# Patient Record
Sex: FEMALE | Employment: FULL TIME | URBAN - METROPOLITAN AREA
[De-identification: names, ages, dates, MRNs, and addresses within clinical notes are randomized per-mention and may not be internally consistent; named-entity substitution may affect disease eponyms.]

---

## 2017-02-17 ENCOUNTER — TELEPHONE (OUTPATIENT)
Dept: FAMILY MEDICINE CLINIC | Facility: CLINIC | Age: 57
End: 2017-02-17

## 2017-02-17 ENCOUNTER — OFFICE VISIT (OUTPATIENT)
Dept: FAMILY MEDICINE CLINIC | Facility: CLINIC | Age: 57
End: 2017-02-17

## 2017-02-17 VITALS
SYSTOLIC BLOOD PRESSURE: 110 MMHG | BODY MASS INDEX: 32.37 KG/M2 | TEMPERATURE: 98 F | WEIGHT: 185 LBS | RESPIRATION RATE: 16 BRPM | HEART RATE: 64 BPM | HEIGHT: 63.25 IN | DIASTOLIC BLOOD PRESSURE: 84 MMHG

## 2017-02-17 DIAGNOSIS — D64.9 ANEMIA, UNSPECIFIED TYPE: ICD-10-CM

## 2017-02-17 DIAGNOSIS — E11.9 DIABETES MELLITUS TYPE 2, NONINSULIN DEPENDENT (HCC): ICD-10-CM

## 2017-02-17 DIAGNOSIS — I10 ESSENTIAL HYPERTENSION: ICD-10-CM

## 2017-02-17 DIAGNOSIS — Z00.00 WELL ADULT EXAM: Primary | ICD-10-CM

## 2017-02-17 DIAGNOSIS — Z12.31 ENCOUNTER FOR SCREENING MAMMOGRAM FOR BREAST CANCER: ICD-10-CM

## 2017-02-17 DIAGNOSIS — E78.5 HYPERLIPIDEMIA, UNSPECIFIED HYPERLIPIDEMIA TYPE: ICD-10-CM

## 2017-02-17 LAB
ALBUMIN SERPL-MCNC: 4.2 G/DL (ref 3.5–4.8)
ALP LIVER SERPL-CCNC: 72 U/L (ref 41–108)
ALT SERPL-CCNC: 35 U/L (ref 14–54)
AST SERPL-CCNC: 22 U/L (ref 15–41)
BILIRUB SERPL-MCNC: 0.5 MG/DL (ref 0.1–2)
BUN BLD-MCNC: 13 MG/DL (ref 8–20)
CALCIUM BLD-MCNC: 9 MG/DL (ref 8.3–10.3)
CHLORIDE: 100 MMOL/L (ref 101–111)
CHOLEST SMN-MCNC: 241 MG/DL (ref ?–200)
CO2: 32 MMOL/L (ref 22–32)
CREAT BLD-MCNC: 0.74 MG/DL (ref 0.55–1.02)
CREAT UR-SCNC: 157 MG/DL
ERYTHROCYTE [DISTWIDTH] IN BLOOD BY AUTOMATED COUNT: 16.2 % (ref 11.5–16)
GLUCOSE BLD-MCNC: 81 MG/DL (ref 70–99)
HCT VFR BLD AUTO: 37.2 % (ref 34–50)
HDLC SERPL-MCNC: 66 MG/DL (ref 45–?)
HDLC SERPL: 3.65 {RATIO} (ref ?–4.44)
HGB BLD-MCNC: 11.6 G/DL (ref 12–16)
LDLC SERPL CALC-MCNC: 160 MG/DL (ref ?–130)
M PROTEIN MFR SERPL ELPH: 8 G/DL (ref 6.1–8.3)
MCH RBC QN AUTO: 22.8 PG (ref 27–33.2)
MCHC RBC AUTO-ENTMCNC: 31.2 G/DL (ref 31–37)
MCV RBC AUTO: 73.2 FL (ref 81–100)
MICROALBUMIN UR-MCNC: 1.05 MG/DL
MICROALBUMIN/CREAT 24H UR-RTO: 6.7 UG/MG (ref ?–30)
NONHDLC SERPL-MCNC: 175 MG/DL (ref ?–130)
PLATELET # BLD AUTO: 199 10(3)UL (ref 150–450)
POTASSIUM SERPL-SCNC: 3.5 MMOL/L (ref 3.6–5.1)
RBC # BLD AUTO: 5.08 X10(6)UL (ref 3.8–5.1)
RED CELL DISTRIBUTION WIDTH-SD: 41.5 FL (ref 35.1–46.3)
SODIUM SERPL-SCNC: 139 MMOL/L (ref 136–144)
TRIGLYCERIDES: 77 MG/DL (ref ?–150)
TSI SER-ACNC: 1.33 MIU/ML (ref 0.35–5.5)
VLDL: 15 MG/DL (ref 5–40)
WBC # BLD AUTO: 7.3 X10(3) UL (ref 4–13)

## 2017-02-17 PROCEDURE — 85027 COMPLETE CBC AUTOMATED: CPT | Performed by: FAMILY MEDICINE

## 2017-02-17 PROCEDURE — 80061 LIPID PANEL: CPT | Performed by: FAMILY MEDICINE

## 2017-02-17 PROCEDURE — 82728 ASSAY OF FERRITIN: CPT | Performed by: FAMILY MEDICINE

## 2017-02-17 PROCEDURE — 82570 ASSAY OF URINE CREATININE: CPT | Performed by: FAMILY MEDICINE

## 2017-02-17 PROCEDURE — 80053 COMPREHEN METABOLIC PANEL: CPT | Performed by: FAMILY MEDICINE

## 2017-02-17 PROCEDURE — 84443 ASSAY THYROID STIM HORMONE: CPT | Performed by: FAMILY MEDICINE

## 2017-02-17 PROCEDURE — 82043 UR ALBUMIN QUANTITATIVE: CPT | Performed by: FAMILY MEDICINE

## 2017-02-17 PROCEDURE — G0438 PPPS, INITIAL VISIT: HCPCS | Performed by: FAMILY MEDICINE

## 2017-02-17 PROCEDURE — 99203 OFFICE O/P NEW LOW 30 MIN: CPT | Performed by: FAMILY MEDICINE

## 2017-02-17 PROCEDURE — 99386 PREV VISIT NEW AGE 40-64: CPT | Performed by: FAMILY MEDICINE

## 2017-02-17 PROCEDURE — 83036 HEMOGLOBIN GLYCOSYLATED A1C: CPT | Performed by: FAMILY MEDICINE

## 2017-02-17 RX ORDER — AMLODIPINE BESYLATE 10 MG/1
1 TABLET ORAL DAILY
Refills: 0 | COMMUNITY
Start: 2016-12-01 | End: 2017-05-30

## 2017-02-17 RX ORDER — METFORMIN HYDROCHLORIDE 500 MG/1
1-3 TABLET, EXTENDED RELEASE ORAL DAILY
Refills: 0 | COMMUNITY
Start: 2016-12-01 | End: 2017-02-20

## 2017-02-17 RX ORDER — INDAPAMIDE 1.25 MG/1
1 TABLET, FILM COATED ORAL EVERY MORNING
Refills: 0 | COMMUNITY
Start: 2016-12-01 | End: 2017-05-30

## 2017-02-17 RX ORDER — LISINOPRIL 20 MG/1
1 TABLET ORAL DAILY
Refills: 0 | COMMUNITY
Start: 2016-12-01 | End: 2017-02-20

## 2017-02-17 NOTE — PROGRESS NOTES
Gagan Zavaleta is a 49year old female.     CC:  Patient presents with:  Establish Care: per pt  Well Adult: per pt      HPI:  Patient is here for yearly, wellness exam  Last Lipid: about one year  Last colonoscopy: about 3 years and normal per pt Status: Never Smoker                      Alcohol Use: No                 ROS:  General: energy level stable  Cardiovascular/Pulses: Denies exertional chest pain or pressure  Respiratory: Denies dyspnea on exertion  GI: Denies abdominal pain    Vitals: BP Future    3. Encounter for screening mammogram for breast cancer      - [facility] SCREENING BILAT (CPT=77067); Future    4. Essential hypertension  Stable   Consider d/c Lisinopril if kidney function fine given her cough    5.  Hyperlipidemia, unspecified hyperli

## 2017-02-18 LAB
EST. AVERAGE GLUCOSE BLD GHB EST-MCNC: 146 MG/DL (ref 68–126)
HBA1C MFR BLD HPLC: 6.7 % (ref ?–5.7)

## 2017-02-20 ENCOUNTER — TELEPHONE (OUTPATIENT)
Dept: FAMILY MEDICINE CLINIC | Facility: CLINIC | Age: 57
End: 2017-02-20

## 2017-02-20 LAB — DEPRECATED HBV CORE AB SER IA-ACNC: 102.5 NG/ML (ref 10–291)

## 2017-02-20 RX ORDER — PHENTERMINE HYDROCHLORIDE 37.5 MG/1
37.5 CAPSULE ORAL EVERY MORNING
Qty: 30 CAPSULE | Refills: 0 | Status: SHIPPED
Start: 2017-02-20 | End: 2017-05-22

## 2017-02-20 NOTE — TELEPHONE ENCOUNTER
Phentermine rx was sent to the pharmacy today (lisinopril and metformin were sent as well but phentermine is the new rx). Is there an alternative that can be prescribed?

## 2017-02-20 NOTE — TELEPHONE ENCOUNTER
Patient notified and verbalized understanding. Appointment already made for 3/20/16. Please sign med. Pharmacy is [facility] [phone]

## 2017-02-20 NOTE — TELEPHONE ENCOUNTER
Pt notified by phone and verbalized understanding. Pt found the medication online listed as costing $11.  She was told it would cost her approximately $30. I informed the pt that we do not have information regarding the price of the medication and that her

## 2017-02-20 NOTE — TELEPHONE ENCOUNTER
There is no alternative, lesser expensive medication that I know of. If she wants, she can contact her insurance and ask for a formulary of weight loss medications.  Thanks

## 2017-02-23 ENCOUNTER — HOSPITAL ENCOUNTER (OUTPATIENT)
Dept: MAMMOGRAPHY | Age: 57
Discharge: HOME | End: 2017-02-23
Attending: FAMILY MEDICINE
Payer: COMMERCIAL

## 2017-02-23 ENCOUNTER — MED REC SCAN ONLY (OUTPATIENT)
Dept: FAMILY MEDICINE CLINIC | Facility: CLINIC | Age: 57
End: 2017-02-23

## 2017-02-23 ENCOUNTER — TELEPHONE (OUTPATIENT)
Dept: FAMILY MEDICINE CLINIC | Facility: CLINIC | Age: 57
End: 2017-02-23

## 2017-02-23 DIAGNOSIS — Z12.31 ENCOUNTER FOR SCREENING MAMMOGRAM FOR BREAST CANCER: ICD-10-CM

## 2017-02-23 PROCEDURE — 77067 SCR MAMMO BI INCL CAD: CPT

## 2017-02-23 RX ORDER — PRAVASTATIN SODIUM 20 MG
20 TABLET ORAL NIGHTLY
Qty: 30 TABLET | Refills: 3 | Status: SHIPPED | OUTPATIENT
Start: 2017-02-23

## 2017-02-23 NOTE — TELEPHONE ENCOUNTER
Please let patient know or leave message that her CT heart score was 27. This puts her at moderate risk for coronary artery narrowing.  We need to place her on Pravachol 20 mg, 1 qhs, #30, 3 rf to reduce her cholesterol levels and thus reduce her risk of he

## 2017-02-23 NOTE — TELEPHONE ENCOUNTER
Patient advised of the CT heart score results  Prescription sent to the pharmacy per Dr. Pool Cerna   Recall done in the computer

## 2017-02-27 ENCOUNTER — HOSPITAL ENCOUNTER (OUTPATIENT)
Dept: ULTRASOUND IMAGING | Age: 57
Discharge: HOME | End: 2017-02-27
Attending: FAMILY MEDICINE
Payer: COMMERCIAL

## 2017-02-27 ENCOUNTER — HOSPITAL ENCOUNTER (OUTPATIENT)
Dept: MAMMOGRAPHY | Age: 57
Discharge: HOME | End: 2017-02-27
Attending: FAMILY MEDICINE
Payer: COMMERCIAL

## 2017-02-27 DIAGNOSIS — R92.8 ABNORMAL MAMMOGRAM OF LEFT BREAST: ICD-10-CM

## 2017-02-27 DIAGNOSIS — N60.02 BREAST CYST, LEFT: Primary | ICD-10-CM

## 2017-02-27 PROCEDURE — 77061 BREAST TOMOSYNTHESIS UNI: CPT

## 2017-02-27 PROCEDURE — 77065 DX MAMMO INCL CAD UNI: CPT

## 2017-02-27 PROCEDURE — 76642 ULTRASOUND BREAST LIMITED: CPT

## 2017-03-08 ENCOUNTER — MED REC SCAN ONLY (OUTPATIENT)
Dept: FAMILY MEDICINE CLINIC | Facility: CLINIC | Age: 57
End: 2017-03-08

## 2017-03-16 ENCOUNTER — TELEPHONE (OUTPATIENT)
Dept: FAMILY MEDICINE CLINIC | Facility: CLINIC | Age: 57
End: 2017-03-16

## 2017-03-17 ENCOUNTER — OFFICE VISIT (OUTPATIENT)
Dept: FAMILY MEDICINE CLINIC | Facility: CLINIC | Age: 57
End: 2017-03-17

## 2017-03-17 ENCOUNTER — TELEPHONE (OUTPATIENT)
Dept: FAMILY MEDICINE CLINIC | Facility: CLINIC | Age: 57
End: 2017-03-17

## 2017-03-17 VITALS
TEMPERATURE: 98 F | DIASTOLIC BLOOD PRESSURE: 84 MMHG | BODY MASS INDEX: 31.85 KG/M2 | SYSTOLIC BLOOD PRESSURE: 118 MMHG | HEIGHT: 63.25 IN | WEIGHT: 182 LBS | HEART RATE: 76 BPM | RESPIRATION RATE: 16 BRPM | OXYGEN SATURATION: 98 %

## 2017-03-17 DIAGNOSIS — N95.0 POST-MENOPAUSAL BLEEDING: Primary | ICD-10-CM

## 2017-03-17 LAB
CONTROL LINE PRESENT WITH A CLEAR BACKGROUND (YES/NO): YES YES/NO
PREGNANCY TEST, URINE: NEGATIVE

## 2017-03-17 PROCEDURE — 99214 OFFICE O/P EST MOD 30 MIN: CPT | Performed by: FAMILY MEDICINE

## 2017-03-17 PROCEDURE — 81025 URINE PREGNANCY TEST: CPT | Performed by: FAMILY MEDICINE

## 2017-03-17 NOTE — TELEPHONE ENCOUNTER
Patient called back the office and advised that her stomach is cramping - she is going to keep the appointment

## 2017-03-17 NOTE — PROGRESS NOTES
Tahir Salazar is a 49year old female. CC:  Patient presents with:  Vaginal Bleeding: spotting per pt       HPI:  She has noted vaginal spotting for 2 days with some pelvic cramping. She has not had menses in 7 years. She is sexually active.  Edinson Paige nourished, in no apparent distress  EYE: B conjunctiva and lids normal  HENT: normocephalic; normal nose, pharynx and TM's  NECK: No lymphadenopathy, thyromegaly or masses  CAR: S1, S2 normal, RRR; no S3, no S4; no click; murmur negative  PULM: clear to au

## 2017-03-20 ENCOUNTER — HOSPITAL ENCOUNTER (OUTPATIENT)
Dept: ULTRASOUND IMAGING | Age: 57
Discharge: HOME | End: 2017-03-20
Attending: FAMILY MEDICINE
Payer: COMMERCIAL

## 2017-03-20 ENCOUNTER — TELEPHONE (OUTPATIENT)
Dept: FAMILY MEDICINE CLINIC | Facility: CLINIC | Age: 57
End: 2017-03-20

## 2017-03-20 DIAGNOSIS — N95.0 POST-MENOPAUSAL BLEEDING: ICD-10-CM

## 2017-03-20 DIAGNOSIS — N95.0 POSTMENOPAUSAL BLEEDING: Primary | ICD-10-CM

## 2017-03-20 PROCEDURE — 76856 US EXAM PELVIC COMPLETE: CPT

## 2017-03-20 PROCEDURE — 76830 TRANSVAGINAL US NON-OB: CPT

## 2017-03-20 NOTE — TELEPHONE ENCOUNTER
----- Message from Joslyn Treviño MD sent at 3/20/2017  1:06 PM CDT -----  Please let patient know or leave message that her pelvic u/s shows 2 fibroids, these may be the cause for her spotting, next step i Consult OB/GYN.  [facility]  I:[phone]  F:

## 2017-03-20 NOTE — TELEPHONE ENCOUNTER
Patient advised of the information per Dr. Katiuska Her  Patient advised that the referral will be placed for Dr. Gaby Metcalf and she will be contacted once the referral determination has been made

## 2017-03-21 NOTE — TELEPHONE ENCOUNTER
Patient advised referral has been approved   Patient states that she has an appointment on Wednesday

## 2017-03-22 ENCOUNTER — OFFICE VISIT (OUTPATIENT)
Dept: OBGYN CLINIC | Facility: CLINIC | Age: 57
End: 2017-03-22

## 2017-03-22 VITALS
WEIGHT: 183 LBS | HEIGHT: 64 IN | BODY MASS INDEX: 31.24 KG/M2 | SYSTOLIC BLOOD PRESSURE: 112 MMHG | DIASTOLIC BLOOD PRESSURE: 80 MMHG

## 2017-03-22 DIAGNOSIS — D25.0 SUBMUCOUS LEIOMYOMA OF UTERUS: ICD-10-CM

## 2017-03-22 DIAGNOSIS — Z12.4 CERVICAL CANCER SCREENING: ICD-10-CM

## 2017-03-22 DIAGNOSIS — N95.0 POST-MENOPAUSAL BLEEDING: Primary | ICD-10-CM

## 2017-03-22 DIAGNOSIS — R10.2 PELVIC PAIN: ICD-10-CM

## 2017-03-22 PROCEDURE — 87624 HPV HI-RISK TYP POOLED RSLT: CPT | Performed by: OBSTETRICS & GYNECOLOGY

## 2017-03-22 PROCEDURE — 88175 CYTOPATH C/V AUTO FLUID REDO: CPT | Performed by: OBSTETRICS & GYNECOLOGY

## 2017-03-22 PROCEDURE — 99203 OFFICE O/P NEW LOW 30 MIN: CPT | Performed by: OBSTETRICS & GYNECOLOGY

## 2017-03-22 NOTE — PATIENT INSTRUCTIONS
Endometrial Biopsy    Endometrial biopsy is a procedure used to study the endometrium (lining of the uterus). It is usually done in your health care provider’s office. During the biopsy, small tissue samples are taken from inside the uterus.  These are th · A small tube is passed through the cervix into the uterus. · It is normal to feel some cramping when the tube is inserted. But tell your health care provider if you have severe cramping or are very uncomfortable.   · Using mild suction, samples are taken

## 2017-03-22 NOTE — PROGRESS NOTES
Dmitry Helm is a 49year old female. HPI:   Patient presents with: Other: referred by Dr. Brianna Mason, fibroids, pt c/o pain in lower abd      As above, but also had postmenopausal spotting for 3-5 days. No menses for the previous 8 years.   Ultra above    PHYSICAL EXAM:   . ..Vulva:  Normal normal.   Introitus. normal  good perineal support. Vagina:  normal.  no discharge. Cervix:  normal. Pap smear was done. HPV screen done. Uterus:  av and normal  size.   Shape:  normal.  Adnexa:  no masses or te

## 2017-03-25 LAB — HPV I/H RISK 1 DNA SPEC QL NAA+PROBE: NEGATIVE

## 2017-04-26 PROCEDURE — 88305 TISSUE EXAM BY PATHOLOGIST: CPT | Performed by: INTERNAL MEDICINE

## 2017-05-22 RX ORDER — PHENTERMINE HYDROCHLORIDE 37.5 MG/1
CAPSULE ORAL
Qty: 30 CAPSULE | Refills: 1 | Status: SHIPPED
Start: 2017-05-22 | End: 2017-05-30

## 2017-05-22 NOTE — TELEPHONE ENCOUNTER
Last refilled on 2/20/17 for # 30 with 0 rf. Last seen on 3/17/17. No future appointments. Thank you.

## 2017-05-30 ENCOUNTER — OFFICE VISIT (OUTPATIENT)
Dept: FAMILY MEDICINE CLINIC | Facility: CLINIC | Age: 57
End: 2017-05-30

## 2017-05-30 VITALS
HEART RATE: 74 BPM | DIASTOLIC BLOOD PRESSURE: 90 MMHG | SYSTOLIC BLOOD PRESSURE: 139 MMHG | TEMPERATURE: 98 F | WEIGHT: 182.81 LBS | BODY MASS INDEX: 31 KG/M2 | RESPIRATION RATE: 16 BRPM

## 2017-05-30 DIAGNOSIS — F32.A DEPRESSION, UNSPECIFIED DEPRESSION TYPE: ICD-10-CM

## 2017-05-30 DIAGNOSIS — I10 ESSENTIAL HYPERTENSION: Primary | ICD-10-CM

## 2017-05-30 PROCEDURE — 99214 OFFICE O/P EST MOD 30 MIN: CPT | Performed by: FAMILY MEDICINE

## 2017-05-30 RX ORDER — AMLODIPINE BESYLATE 10 MG/1
10 TABLET ORAL DAILY
Qty: 30 TABLET | Refills: 2 | Status: SHIPPED | OUTPATIENT
Start: 2017-05-30 | End: 2018-01-21

## 2017-05-30 RX ORDER — INDAPAMIDE 1.25 MG/1
1.25 TABLET, FILM COATED ORAL EVERY MORNING
Qty: 30 TABLET | Refills: 2 | Status: SHIPPED | OUTPATIENT
Start: 2017-05-30 | End: 2017-07-11

## 2017-05-30 NOTE — PROGRESS NOTES
Rosemarie Lawrence is a 56year old female.     CC:  Patient presents with:  Blood Pressure      HPI:  Here to follow up hypertension  Home BP readings: 150/100  Medication side effects: none  Chest pain: none  Headaches: yes  Visual changes: none  SOB 182 lb 12.8 oz   Reviewed by Erika Juarez M.D.     Physical Exam:  GEN: well developed, well nourished, in no apparent distress  EYE: B conjunctiva and lids normal  HENT: normocephalic; normal nose, pharynx and TM's  NECK: No lymphadenopathy, thyromegaly or

## 2017-07-11 RX ORDER — INDAPAMIDE 1.25 MG/1
TABLET, FILM COATED ORAL
Qty: 90 TABLET | Refills: 2 | Status: SHIPPED | OUTPATIENT
Start: 2017-07-11 | End: 2018-07-27

## 2017-08-01 ENCOUNTER — TELEPHONE (OUTPATIENT)
Dept: FAMILY MEDICINE CLINIC | Facility: CLINIC | Age: 57
End: 2017-08-01

## 2017-08-01 DIAGNOSIS — D64.9 ANEMIA, UNSPECIFIED TYPE: ICD-10-CM

## 2017-08-01 DIAGNOSIS — E78.5 HYPERLIPIDEMIA, UNSPECIFIED HYPERLIPIDEMIA TYPE: ICD-10-CM

## 2017-08-01 DIAGNOSIS — E11.9 CONTROLLED TYPE 2 DIABETES MELLITUS WITHOUT COMPLICATION, WITHOUT LONG-TERM CURRENT USE OF INSULIN (HCC): Primary | ICD-10-CM

## 2017-08-01 NOTE — TELEPHONE ENCOUNTER
Called and spoke to patient notified of information.    Nurse appt booked for tomorrow at 845am.   Cancelled appt scheduled with TJ for 8/16/2017

## 2017-08-01 NOTE — TELEPHONE ENCOUNTER
Pt called to make F/U appt from May. Pt wasn't sure if she just needed lab work or to see [facility] again.  DoeAlso, does pt need to fast?

## 2017-08-02 ENCOUNTER — NURSE ONLY (OUTPATIENT)
Dept: FAMILY MEDICINE CLINIC | Facility: CLINIC | Age: 57
End: 2017-08-02

## 2017-08-02 DIAGNOSIS — E78.5 HYPERLIPIDEMIA, UNSPECIFIED HYPERLIPIDEMIA TYPE: ICD-10-CM

## 2017-08-02 DIAGNOSIS — E11.9 CONTROLLED TYPE 2 DIABETES MELLITUS WITHOUT COMPLICATION, WITHOUT LONG-TERM CURRENT USE OF INSULIN (HCC): Primary | ICD-10-CM

## 2017-08-02 DIAGNOSIS — D64.9 ANEMIA, UNSPECIFIED TYPE: ICD-10-CM

## 2017-08-02 DIAGNOSIS — E11.9 CONTROLLED TYPE 2 DIABETES MELLITUS WITHOUT COMPLICATION, WITHOUT LONG-TERM CURRENT USE OF INSULIN (HCC): ICD-10-CM

## 2017-08-02 LAB
ALBUMIN SERPL-MCNC: 3.9 G/DL (ref 3.5–4.8)
ALP LIVER SERPL-CCNC: 70 U/L (ref 41–108)
ALT SERPL-CCNC: 24 U/L (ref 14–54)
AST SERPL-CCNC: 15 U/L (ref 15–41)
BILIRUB SERPL-MCNC: 0.6 MG/DL (ref 0.1–2)
BUN BLD-MCNC: 19 MG/DL (ref 8–20)
CALCIUM BLD-MCNC: 9.1 MG/DL (ref 8.3–10.3)
CHLORIDE: 102 MMOL/L (ref 101–111)
CHOLEST SMN-MCNC: 239 MG/DL (ref ?–200)
CO2: 31 MMOL/L (ref 22–32)
CREAT BLD-MCNC: 0.77 MG/DL (ref 0.55–1.02)
CREAT UR-SCNC: 285 MG/DL
ERYTHROCYTE [DISTWIDTH] IN BLOOD BY AUTOMATED COUNT: 15.9 % (ref 11.5–16)
EST. AVERAGE GLUCOSE BLD GHB EST-MCNC: 137 MG/DL (ref 68–126)
GLUCOSE BLD-MCNC: 105 MG/DL (ref 70–99)
HBA1C MFR BLD HPLC: 6.4 % (ref ?–5.7)
HCT VFR BLD AUTO: 36.9 % (ref 34–50)
HDLC SERPL-MCNC: 55 MG/DL (ref 45–?)
HDLC SERPL: 4.35 {RATIO} (ref ?–4.44)
HGB BLD-MCNC: 11.4 G/DL (ref 12–16)
LDLC SERPL CALC-MCNC: 167 MG/DL (ref ?–130)
LDLC SERPL-MCNC: 17 MG/DL (ref 5–40)
M PROTEIN MFR SERPL ELPH: 7.4 G/DL (ref 6.1–8.3)
MCH RBC QN AUTO: 22.4 PG (ref 27–33.2)
MCHC RBC AUTO-ENTMCNC: 30.9 G/DL (ref 31–37)
MCV RBC AUTO: 72.6 FL (ref 81–100)
MICROALBUMIN UR-MCNC: 2.35 MG/DL
MICROALBUMIN/CREAT 24H UR-RTO: 8.2 UG/MG (ref ?–30)
NONHDLC SERPL-MCNC: 184 MG/DL (ref ?–130)
PLATELET # BLD AUTO: 276 10(3)UL (ref 150–450)
POTASSIUM SERPL-SCNC: 3.6 MMOL/L (ref 3.6–5.1)
RBC # BLD AUTO: 5.08 X10(6)UL (ref 3.8–5.1)
RED CELL DISTRIBUTION WIDTH-SD: 41 FL (ref 35.1–46.3)
SODIUM SERPL-SCNC: 139 MMOL/L (ref 136–144)
TRIGLYCERIDES: 87 MG/DL (ref ?–150)
WBC # BLD AUTO: 7.3 X10(3) UL (ref 4–13)

## 2017-08-02 PROCEDURE — 82570 ASSAY OF URINE CREATININE: CPT | Performed by: FAMILY MEDICINE

## 2017-08-02 PROCEDURE — 82043 UR ALBUMIN QUANTITATIVE: CPT | Performed by: FAMILY MEDICINE

## 2017-08-02 PROCEDURE — 83036 HEMOGLOBIN GLYCOSYLATED A1C: CPT | Performed by: FAMILY MEDICINE

## 2017-08-02 PROCEDURE — 36415 COLL VENOUS BLD VENIPUNCTURE: CPT | Performed by: FAMILY MEDICINE

## 2017-08-02 PROCEDURE — 80053 COMPREHEN METABOLIC PANEL: CPT | Performed by: FAMILY MEDICINE

## 2017-08-02 PROCEDURE — 85027 COMPLETE CBC AUTOMATED: CPT | Performed by: FAMILY MEDICINE

## 2017-08-02 PROCEDURE — 80061 LIPID PANEL: CPT | Performed by: FAMILY MEDICINE

## 2017-08-02 NOTE — PROGRESS NOTES
Mint and lav tubes drawn from right arm with butterfly needle x1 after 1 unsuccessful attempt in the left arm    Pt nicol well    Urine collected for microalbumin

## 2017-08-03 ENCOUNTER — TELEPHONE (OUTPATIENT)
Dept: FAMILY MEDICINE CLINIC | Facility: CLINIC | Age: 57
End: 2017-08-03

## 2017-08-03 NOTE — TELEPHONE ENCOUNTER
Ok, let's give her 3 months. Repeat Lipids in 3 months. Can you remove the ast, alt orders?   Thanks

## 2017-08-03 NOTE — TELEPHONE ENCOUNTER
----- Message from Lenoard Leyden, MD sent at 8/2/2017  6:42 PM CDT -----  Please let patient know or leave message that her diabetes is very well controlled. Let's stop the Metformin and repeat the a1c in 3 months--order placed.   She is not spilling micro

## 2017-08-03 NOTE — TELEPHONE ENCOUNTER
Patient advised of the blood work results - patient states that she has not been taking the Pravastatin since about February - it was giving her cramps- Can patient work on lowering her numbers herself or do you want to try a different medication ?

## 2017-08-23 ENCOUNTER — TELEPHONE (OUTPATIENT)
Dept: FAMILY MEDICINE CLINIC | Facility: CLINIC | Age: 57
End: 2017-08-23

## 2017-08-23 DIAGNOSIS — R05.9 COUGH: Primary | ICD-10-CM

## 2017-08-23 DIAGNOSIS — Z77.120 MOLD EXPOSURE: ICD-10-CM

## 2017-08-23 NOTE — TELEPHONE ENCOUNTER
We do not do mold testing. I would have her Consult Pulmonology to further evaluate. Brice Decker   P: [phone] thanks

## 2017-08-23 NOTE — TELEPHONE ENCOUNTER
Patient wants to know if we do mold testing here. Patient has been living in a house for quite some time that has mold.  They are not feeling quite right and wants to know if they need to come in to see Dr Julita Higgins or if he can put orders in for some testing

## 2017-08-23 NOTE — TELEPHONE ENCOUNTER
Patient advised that the referral has been placed and approved by insurance.  Phone number provided to patient to call and schedule an appointment

## 2017-09-05 ENCOUNTER — TELEPHONE (OUTPATIENT)
Dept: FAMILY MEDICINE CLINIC | Facility: CLINIC | Age: 57
End: 2017-09-05

## 2017-09-05 DIAGNOSIS — Z77.120 MOLD EXPOSURE: ICD-10-CM

## 2017-09-05 DIAGNOSIS — R05.9 COUGH: Primary | ICD-10-CM

## 2017-09-05 NOTE — TELEPHONE ENCOUNTER
Spoke with pulmonology reception who states they have availability in [place] with Dr Sheila Asencio 9/26/17. Patient notified and would like referral placed for Dr Sheila Asencio. Patient to call and schedule appt. Referral placed.

## 2017-09-05 NOTE — TELEPHONE ENCOUNTER
Patient said she needs to have this mold testing. She called us last week and we referred her to Dr. Mitch Barcenas. She called the number we gave her and left a message and no one ever called her back.  She was exposed to mold getting really sick needs \"someon

## 2017-09-05 NOTE — TELEPHONE ENCOUNTER
PT CALLED TO [facility] THE FIRST AVAILABLE WITH DR DAHL IS NOT UNTIL THE END OF October.     IS THERE ANYONE ELSE THAT SHE CAN BE REFERRED TO?    PLEASE CALL    Whit Recinos

## 2017-09-05 NOTE — TELEPHONE ENCOUNTER
Patient said that Dr. Gresham Nunica office has never returned her call for an appointment. Patient was given the information for Dr. Erik Kapadia thru Claire Thao- or is there another pulmonologist that you would prefer.  Patient is going to call and try

## 2017-09-12 ENCOUNTER — TELEPHONE (OUTPATIENT)
Dept: FAMILY MEDICINE CLINIC | Facility: CLINIC | Age: 57
End: 2017-09-12

## 2017-09-12 RX ORDER — PREDNISONE 20 MG/1
TABLET ORAL
Qty: 18 TABLET | Refills: 0 | Status: SHIPPED | OUTPATIENT
Start: 2017-09-12

## 2017-09-12 NOTE — TELEPHONE ENCOUNTER
Called and spoke to patient she states she has an appt for 9- with Dr. Dean Guillaume and will make sure she goes to appt. Notified of information, she would like RX sent over to [facility] on [address]  in [facility].    Patient notified and verbalized unde

## 2017-09-12 NOTE — TELEPHONE ENCOUNTER
Please have her keep appt with Pulmonology. Mold exposure can potentially cause her symptoms. Many times it is due to an allergic type reaction to the spores. She needs to remove herself from the environment.   We can also place her on Prednisone 20 mg, 3

## 2017-09-12 NOTE — TELEPHONE ENCOUNTER
Patient states she has been dealing with this for a little while now, and has been in contact with you regarding the mold in her house.  She states that she can not remember if she scheduled an appt with Dr. Renetta Stokes (pulmo) or not, I gave patient the numb

## 2017-09-26 ENCOUNTER — MED REC SCAN ONLY (OUTPATIENT)
Dept: FAMILY MEDICINE CLINIC | Facility: CLINIC | Age: 57
End: 2017-09-26

## 2017-12-11 ENCOUNTER — HOSPITAL ENCOUNTER (OUTPATIENT)
Dept: GENERAL RADIOLOGY | Facility: HOSPITAL | Age: 57
Discharge: HOME | End: 2017-12-11
Attending: INTERNAL MEDICINE
Payer: COMMERCIAL

## 2017-12-11 ENCOUNTER — RT VISIT (OUTPATIENT)
Dept: RESPIRATORY THERAPY | Facility: HOSPITAL | Age: 57
End: 2017-12-11
Attending: INTERNAL MEDICINE
Payer: COMMERCIAL

## 2017-12-11 DIAGNOSIS — R06.00 DYSPNEA: ICD-10-CM

## 2017-12-11 DIAGNOSIS — R05.9 COUGH: ICD-10-CM

## 2017-12-11 PROCEDURE — 94726 PLETHYSMOGRAPHY LUNG VOLUMES: CPT

## 2017-12-11 PROCEDURE — 94729 DIFFUSING CAPACITY: CPT

## 2017-12-11 PROCEDURE — 94010 BREATHING CAPACITY TEST: CPT

## 2017-12-11 PROCEDURE — 71020 XR CHEST PA + LAT CHEST (CPT=71020): CPT | Performed by: INTERNAL MEDICINE

## 2017-12-12 ENCOUNTER — MED REC SCAN ONLY (OUTPATIENT)
Dept: FAMILY MEDICINE CLINIC | Facility: CLINIC | Age: 57
End: 2017-12-12

## 2018-01-22 RX ORDER — AMLODIPINE BESYLATE 10 MG/1
TABLET ORAL
Qty: 30 TABLET | Refills: 2 | Status: SHIPPED | OUTPATIENT
Start: 2018-01-22

## 2018-01-22 NOTE — TELEPHONE ENCOUNTER
Last refilled on 5/30/17 for # 30 with 2 rf. Last labs 8/2/17. Last seen on 5/30/17. /90. No future appointments. Thank you.

## 2018-07-24 ENCOUNTER — TELEPHONE (OUTPATIENT)
Dept: FAMILY MEDICINE CLINIC | Facility: CLINIC | Age: 58
End: 2018-07-24

## 2018-07-27 RX ORDER — INDAPAMIDE 1.25 MG/1
TABLET, FILM COATED ORAL
Qty: 90 TABLET | Refills: 2 | OUTPATIENT
Start: 2018-07-27

## 2018-07-27 RX ORDER — INDAPAMIDE 1.25 MG/1
TABLET, FILM COATED ORAL
Qty: 90 TABLET | Refills: 2 | Status: SHIPPED | OUTPATIENT
Start: 2018-07-27 | End: 2019-08-15

## 2018-07-27 NOTE — TELEPHONE ENCOUNTER
Last refilled on 7/11/17 for # 90 with 2 refills  Last BUN 19, creatinine 0.77 on 8/2/17  Last seen on 5/30/17, /90  No future appointments. Thank you.

## 2019-02-21 ENCOUNTER — PATIENT OUTREACH (OUTPATIENT)
Dept: FAMILY MEDICINE CLINIC | Facility: CLINIC | Age: 59
End: 2019-02-21

## 2019-03-18 RX ORDER — AMLODIPINE BESYLATE 10 MG/1
TABLET ORAL
Qty: 30 TABLET | Refills: 2 | OUTPATIENT
Start: 2019-03-18

## 2019-03-18 NOTE — TELEPHONE ENCOUNTER
Last refilled on 1/22/18 for # 30 with 2 refills  Last OV 5/30/17, /90  No future appointments. Thank you.

## 2019-08-15 RX ORDER — INDAPAMIDE 1.25 MG/1
TABLET, FILM COATED ORAL
Qty: 30 TABLET | Refills: 0 | Status: SHIPPED | OUTPATIENT
Start: 2019-08-15

## 2019-08-15 RX ORDER — INDAPAMIDE 1.25 MG/1
TABLET, FILM COATED ORAL
Qty: 90 TABLET | Refills: 0 | OUTPATIENT
Start: 2019-08-15

## 2019-08-15 NOTE — TELEPHONE ENCOUNTER
Left message on patients voice mail with the information per Dr. Magno Marin. Phone number provided to patient to call the office and make an appointment.

## 2019-08-15 NOTE — TELEPHONE ENCOUNTER
Last refilled on 7/27/18 for # 90 with 2 refills  Last OV 5/30/17  No future appointments. Thank you.

## 2019-08-15 NOTE — TELEPHONE ENCOUNTER
Please let patient or caregiver know or leave message that her Indapamide has been refilled for one month. She has not been seen in over 2 years. I will need to see her before refilling again. Set up a wellness exam and have her come fasting for labs.  Than

## 2019-09-03 RX ORDER — INDAPAMIDE 1.25 MG/1
TABLET, FILM COATED ORAL
Qty: 90 TABLET | Refills: 0 | OUTPATIENT
Start: 2019-09-03

## 2019-10-23 RX ORDER — INDAPAMIDE 1.25 MG/1
TABLET, FILM COATED ORAL
Qty: 30 TABLET | Refills: 0 | OUTPATIENT
Start: 2019-10-23

## 2019-10-23 NOTE — TELEPHONE ENCOUNTER
Last refill on Indapamide #30 with 0 refills on 8 15 2019   Last OV on 5 30 2017  No future appointments   Refill denied

## (undated) NOTE — LETTER
[address] [facility]  [place] [address]  [phone]          2/21/2019        [address] [place] [phone]          Dear Patient,     According to

## (undated) NOTE — MR AVS SNAPSHOT
[address]  [place] [address] [address]  [phone]               Thank you for choosing us for your health care visit with Pedro Luis Lema MD.  We are glad to serve you and happy to provide you with this summary of you Endometrial biopsy may help pinpoint the cause of certain problems.  These include:  · Bleeding after menopause  · Heavy or irregular menstrual periods  · Bleeding associated with hormone therapy  · Prolonged bleeding  · Abnormal Pap test results  · Having · If you feel lightheaded or dizzy, you can rest on the table until you’re ready to get dressed. · For a few hours, you may feel some mild cramping. This can usually be relieved with over-the-counter pain medicines.   · You may have some bleeding for a few Commonly known as:  PRINIVIL,ZESTRIL           MetFORMIN HCl  MG Tb24   Take 1-3 tablets (500-1,500 mg total) by mouth daily with breakfast.   Commonly known as:  GLUCOPHAGE-XR           Phentermine HCl 37.5 MG Caps   Take 1 capsule (37.5 mg total) b Choose whole grain products Foods high in sodium   Water is best for hydration Fast food.    Eat at home when possible     Tips for increasing your physical activity – Adults who are physically active are less likely to develop some chronic diseases than ad

## (undated) NOTE — LETTER
[address] [address] [place] [address]  [phone]            7/24/2018        [place] [phone]          Dear Patient,     According to ou